# Patient Record
Sex: MALE | Race: ASIAN | NOT HISPANIC OR LATINO | Employment: FULL TIME | URBAN - METROPOLITAN AREA
[De-identification: names, ages, dates, MRNs, and addresses within clinical notes are randomized per-mention and may not be internally consistent; named-entity substitution may affect disease eponyms.]

---

## 2020-06-18 ENCOUNTER — TELEPHONE (OUTPATIENT)
Dept: SCHEDULING | Facility: IMAGING CENTER | Age: 35
End: 2020-06-18

## 2020-08-07 ENCOUNTER — OFFICE VISIT (OUTPATIENT)
Dept: MEDICAL GROUP | Facility: MEDICAL CENTER | Age: 35
End: 2020-08-07
Payer: COMMERCIAL

## 2020-08-07 VITALS
SYSTOLIC BLOOD PRESSURE: 130 MMHG | TEMPERATURE: 97.8 F | RESPIRATION RATE: 16 BRPM | OXYGEN SATURATION: 94 % | WEIGHT: 169.75 LBS | BODY MASS INDEX: 25.73 KG/M2 | HEIGHT: 68 IN | HEART RATE: 67 BPM | DIASTOLIC BLOOD PRESSURE: 86 MMHG

## 2020-08-07 DIAGNOSIS — Z82.49 FAMILY HISTORY OF HEART DISEASE: ICD-10-CM

## 2020-08-07 DIAGNOSIS — Z86.14 HISTORY OF MRSA INFECTION: ICD-10-CM

## 2020-08-07 DIAGNOSIS — G40.409 OTHER GENERALIZED EPILEPSY, NOT INTRACTABLE, WITHOUT STATUS EPILEPTICUS (HCC): ICD-10-CM

## 2020-08-07 DIAGNOSIS — H02.402 PTOSIS OF LEFT EYELID: ICD-10-CM

## 2020-08-07 DIAGNOSIS — Z13.220 LIPID SCREENING: ICD-10-CM

## 2020-08-07 DIAGNOSIS — H02.846 SWELLING OF LEFT EYELID: ICD-10-CM

## 2020-08-07 PROBLEM — G40.909 NONINTRACTABLE EPILEPSY WITHOUT STATUS EPILEPTICUS (HCC): Status: ACTIVE | Noted: 2020-08-07

## 2020-08-07 PROCEDURE — 99204 OFFICE O/P NEW MOD 45 MIN: CPT | Performed by: NURSE PRACTITIONER

## 2020-08-07 RX ORDER — LEVETIRACETAM 500 MG/1
500 TABLET, FILM COATED, EXTENDED RELEASE ORAL DAILY
Qty: 270 TAB | Refills: 1 | Status: SHIPPED | OUTPATIENT
Start: 2020-08-07 | End: 2020-09-08 | Stop reason: SDUPTHER

## 2020-08-07 RX ORDER — LEVETIRACETAM 500 MG/1
500 TABLET, FILM COATED, EXTENDED RELEASE ORAL DAILY
COMMUNITY
End: 2020-08-07 | Stop reason: SDUPTHER

## 2020-08-07 NOTE — LETTER
[facility]  JORGE Mckinney.  [address]  [place] [address]  Fax: [phone]   Authorization for Release/Disclosure of   Protected Health Information   Name: RICARDO STOCK : 1985 SSN: [id number]   Address: [address] [place] [address]  [place] [address] Phone:    [phone] (home)    I authorize the entity listed below to release/disclose the PHI below to:   [facility]/BLANCHE Mckinney and BLANCHE Mckinney   Provider or Entity Name:  [facility]   Address   City, [facility], [place], [place] Phone:  [phone]    Fax:     Reason for request: continuity of care   Information to be released:    [  ] LAST COLONOSCOPY,  including any PATH REPORT and follow-up  [  ] LAST FIT/COLOGUARD RESULT [  ] LAST DEXA  [  ] LAST MAMMOGRAM  [  ] LAST PAP  [  ] LAST LABS [  ] RETINA EXAM REPORT  [  ] IMMUNIZATION RECORDS  [  ] Release all info      [  ] Check here and initial the line next to each item to release ALL health information INCLUDING  _____ Care and treatment for drug and / or alcohol abuse  _____ HIV testing, infection status, or AIDS  _____ Genetic Testing    DATES OF SERVICE OR TIME PERIOD TO BE DISCLOSED: _____________  I understand and acknowledge that:  * This Authorization may be revoked at any time by you in writing, except if your health information has already been used or disclosed.  * Your health information that will be used or disclosed as a result of you signing this authorization could be re-disclosed by the recipient. If this occurs, your re-disclosed health information may no longer be protected by State or Federal laws.  * You may refuse to sign this Authorization. Your refusal will not affect your ability to obtain treatment.  * This Authorization becomes effective upon signing and will  on (date) __________.      If no date is indicated, this Authorization will  one (1) year from the signature date.    Name: Ricardo ESPINAL  Edgar    Signature:   Date:     8/7/2020       PLEASE FAX REQUESTED RECORDS BACK TO: [phone]

## 2020-08-07 NOTE — ASSESSMENT & PLAN NOTE
This is a chronic issue which was initially diagnosed in 2017, he was living in [place] at the time.  He was started on Keppra and has been consistent with this over the last 3 years, has not had any further seizures.  He is needing to establish with a local neurologist

## 2020-08-07 NOTE — LETTER
"      8/7/2020    Subject: Action Required to Complete [employer] Activation    Dear Michi Hernández,    Thank you for enrolling in [employer], a free online tool where you can schedule appointments, request prescription refills, view test results and more. To complete your [employer] activation, please follow the instructions below.     1. Visit [employer]     2. Click \"Enter Code\"    3. Enter activation code: [email or web address]  Expires: 9/6/2020  9:23 AM    4. Follow the instructions on screen to complete the quick, 3-step activation    Once you’ve completed your activation, you’re ready to view your medical record. Please remember, [employer] is not to be used for urgent needs. For medical emergencies, dial 911.    [employer]’s secure online tool allows you to manage your health information day or night. [employer] allows you to:    • Schedule and view appointments  • View test results  • Request prescription refills  • Message your healthcare provider  • Keep track of your family’s health  • Review immunization records  • View or download your Summary of Care document    Download the L4 Mobile Mei   After you’ve created a login by following the steps above, you can download the L4 Mobile mei for your smartphone for even quicker access. Simply visit the mei store and search “[employer]4 Mobile.”    For questions or assistance with [employer], please call Customer Service at [phone].    Sincerely,  Customer Service Team  "

## 2020-08-08 NOTE — PROGRESS NOTES
Chief Complaint   Patient presents with   • Establish Care     new patient   • Referral Needed     neuro, seizure 2017 epilepsy    • Eye Problem     L eye swollen 9-10 months, itching comes and goes     Michilevar Hernández is a 34 y.o. male here to establish care.  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  He was started on Keppra and has been consistent with this over the last 3 years, has not had any further seizures.  He is needing to establish with a local neurologist    Swelling of left eyelid, history of MRSA  This is been an intermittent problem over the last several years, seems to fluctuate but is typically mild.  He does report occasional irritation or slight erythema but has not had any vision change, discharge from the eye, eye injection.  He reports that this was evaluated in [place] by an ophthalmologist, at one point he had a culture from the eye that was positive for MRSA, he was seen by infectious disease at that time and treated with antibiotics.  Symptoms largely resolved but then over the last few months he is again noted the mild swelling of the eyelid.  Denies any fever, chills, headache, nasal congestion or rhinitis    Current medicines (including changes today)  Current Outpatient Medications   Medication Sig Dispense Refill   • levetiracetam (KEPPRA) 500 MG TABLET SR 24 HR Take 1 Tab by mouth every day. Take 3 tabs PO every day 270 Tab 1     No current facility-administered medications for this visit.      He  has no past medical history on file.  He  has no past surgical history on file.  Social History     Tobacco Use   • Smoking status: Never Smoker   • Smokeless tobacco: Current User     Types: Chew   Substance Use Topics   • Alcohol use: Not on file   • Drug use: Not on file     Social History     Social History Narrative   • Not on  "file     Family History   Problem Relation Age of Onset   • Heart Disease Father 60        CABG x 3   • Hyperlipidemia Father    • Hypertension Father      Family Status   Relation Name Status   • Mo  Alive   • Fa  Alive   • Bro  Alive         ROS  Problems listed discussed above, all other systems reviewed and negative     Objective:     /86 (BP Location: Left arm, Patient Position: Sitting, BP Cuff Size: Adult)   Pulse 67   Temp 36.6 °C (97.8 °F) (Temporal)   Resp 16   Ht 1.727 m (5' 8\")   Wt 77 kg (169 lb 12.1 oz)   SpO2 94%  Body mass index is 25.81 kg/m².  Physical Exam:  General: Alert, oriented in no acute distress.  Eye contact is good, speech is normal, affect calm  HEENT: EOMI, perrl, sclera and conjunctiva clear.  Very mild swelling of the left upper eyelid without erythema.   No cervical or supraclavicular lymphadenopathy, thyroid isthmus palpable without masses or nodules.  Lungs: clear to auscultation bilaterally, good aeration, normal effort. No wheeze/ rhonchi/ rales.  CV: regular rate and rhythm, S1, S2. No murmur, no JVD, no edema. Pedal pulses 2 + bilaterally  Abdomen: soft, nontender, BS x4.  Ext: color normal, vascularity normal, temperature normal. No rash or lesions.    Assessment and Plan:   The following treatment plan was discussed   1. Other generalized epilepsy, not intractable, without status epilepticus (HCC)   stable on medication, no recent events.  Refill provided, he will establish with local neurology  levetiracetam (KEPPRA) 500 MG TABLET SR 24 HR    REFERRAL TO NEUROLOGY    Comp Metabolic Panel   2. Ptosis of left eyelid  REFERRAL TO OPHTHALMOLOGY   3. Swelling of left eyelid   slight swelling on exam today without erythema.  He reports having had MRSA infection in his eyes several years ago when living in [place], he had been evaluated by infectious disease at that time.  We will request records related to this.  No evidence of acute infection on exam today but with " continued intermittent swelling I will refer him back to ophthalmology for reevaluation  REFERRAL TO OPHTHALMOLOGY   4. History of MRSA infection  REFERRAL TO OPHTHALMOLOGY   5. Family history of heart disease  Lipid Profile   6. Lipid screening  Lipid Profile       Educated in proper administration of medication(s) ordered today including safety, possible SE, risks, benefits, rationale and alternatives to therapy.       Followup: Pending labs             Please note that this dictation was created using voice recognition software. I have worked with consultants from the vendor as well as technical experts from [employer] to optimize the interface. I have made every reasonable attempt to correct obvious errors, but I expect that there are errors of grammar and possibly content that I did not discover before finalizing the note.

## 2020-08-08 NOTE — ASSESSMENT & PLAN NOTE
This is been an intermittent problem over the last several years, seems to fluctuate but is typically mild.  He does report occasional irritation or slight erythema but has not had any vision change, discharge from the eye, eye injection.  He reports that this was evaluated in [place] by an ophthalmologist, at one point he had a culture from the eye that was positive for MRSA, he was seen by infectious disease at that time and treated with antibiotics.  Symptoms largely resolved but then over the last few months he is again noted the mild swelling of the eyelid

## 2020-08-31 NOTE — PROGRESS NOTES
Chief Complaint   Patient presents with   • New Patient     epilepsy       Problem List Items Addressed This Visit     None      Visit Diagnoses     Localization-related epilepsy (HCC)        Relevant Medications    levetiracetam (KEPPRA) 500 MG TABLET SR 24 HR    Other Relevant Orders    REFERRAL TO NEURODIAGNOSTICS (EEG,EP,EMG/NCS/DBS) Modality Requested: EEG-Video (routine)    MR-BRAIN-WITH & W/O    CBC WITH DIFFERENTIAL    KEPPRA    Vitamin D deficiency        Relevant Orders    VITAMIN D,25 HYDROXY    Screening for depression              History of present illness:  Michi Hernández 35 y.o. male presents today for seizure evaluation.  Referred by PCP.     Pt reports he had a spell in Feb 2017. He reports he lost consciousness and fell on the floor and had a convulsion for about 1 minute. No mouth trauma or incontinence. He had no recollection of events. He was told then that his EEG was mildly abnormal and he was started on keppra. He remembers having LOC a couple months after, he is not sure if he had a convulsion but he remembers waking up and losing track of time. He also recalls missing a dose of keppra then.  He admits to having a lot of stress in 2017 and the night before his initial spell, he had alcoholic drinks. So he called his neurologist at that time who increased his dose to Keppra ER 500mg, 3 tabs daily. No further spells. Denies any staring or jerking spells.     No family hx seizures. Several hx of head concussions from playing football    Drinks alcohol rarely. Not smoking cigarettes. No recreational drug use.     Mood is good. Denies depression or anxiety. No suicidal or homicidal thoughts.      He takes vit D daily.     Driving with no issues.       Past medical history:   Past Medical History:   Diagnosis Date   • Seizure (HCC)        Past surgical history:   History reviewed. No pertinent surgical history.    Family history:   Family History   Problem Relation Age of Onset   • Heart  Disease Father 60        CABG x 3   • Hyperlipidemia Father    • Hypertension Father        Social history:   Social History     Socioeconomic History   • Marital status: Single     Spouse name: Not on file   • Number of children: Not on file   • Years of education: Not on file   • Highest education level: Not on file   Occupational History   • Not on file   Social Needs   • Financial resource strain: Not on file   • Food insecurity     Worry: Not on file     Inability: Not on file   • Transportation needs     Medical: Not on file     Non-medical: Not on file   Tobacco Use   • Smoking status: Never Smoker   • Smokeless tobacco: Current User     Types: Chew   Substance and Sexual Activity   • Alcohol use: Not on file   • Drug use: Not on file   • Sexual activity: Not on file   Lifestyle   • Physical activity     Days per week: Not on file     Minutes per session: Not on file   • Stress: Not on file   Relationships   • Social connections     Talks on phone: Not on file     Gets together: Not on file     Attends [employer] service: Not on file     Active member of club or organization: Not on file     Attends meetings of clubs or organizations: Not on file     Relationship status: Not on file   • Intimate partner violence     Fear of current or ex partner: Not on file     Emotionally abused: Not on file     Physically abused: Not on file     Forced sexual activity: Not on file   Other Topics Concern   • Not on file   Social History Narrative   • Not on file       Current medications:   Current Outpatient Medications   Medication   • levetiracetam (KEPPRA) 500 MG TABLET SR 24 HR     No current facility-administered medications for this visit.        Medication Allergy:  No Known Allergies      Review of systems:     General: Denies fevers or chills, or nightsweats, or generalized fatigue.    Head: Denies headaches or dizziness or lightheadedness  EENT: Denies vision changes, vision loss or pain, nasal secretion, nasal  "bleeding, difficulty swallowing, hearing loss, tinnitus, vertigo, ear pain  Respiratory: Denies shortness of breath, cough, sputum, or wheezing  Cardiac: Denies chest pain, palpitations, edema or syncope  Gastrointestinal: Denies nausea, vomiting, no abdominal pain or change in bowel habits, no melena or hematochezia  Urinary: Denies dysuria, frequency, hesitancy, or incontinence.  Dermatologic:  Denies new rash  Musculoskeletal: Denies muscle pain or swelling, no atrophy, no neck and back pain or stiffness.   Neurologic: Denies facial droopiness, muscle weakness (focal or generalized), paresthesias, ataxia, change in speech or language, memory loss, abnormal movements, seizures, loss of consciousness, or episodes of confusion.   Psychiatric: Denies anxiety, depression, mood swings, suicidal or homicidal thoughts       Physical examination:   Vitals:    09/08/20 1039   BP: 122/68   BP Location: Right arm   Patient Position: Sitting   BP Cuff Size: Adult   Pulse: (!) 58   Resp: 18   Temp: (!) 35.5 °C (95.9 °F)   TempSrc: Temporal   SpO2: 96%   Weight: 77 kg (169 lb 12.1 oz)   Height: 1.727 m (5' 8\")     General: Patient in no acute distress, pleasant and cooperative.  HEENT: Normocephalic, no signs of acute trauma.   moist conjunctivae. Nares are patent. Oropharynx clear without lesions and normal  hard and soft palates.   Neck: Supple. There is normal range of motion.   Resp: clear to auscultation bilaterally. No wheezes or crackles.   CV: RRR, no murmurs.   Skin: no signs of acute rashes or trauma.   Musculoskeletal: joints exhibit full range of motion, without any pain to palpation. There are no signs of joint or muscle swelling. There is no tenderness to deep palpation of muscles.   Psychiatric: No hallucinatory behavior. No symptoms of depression or suicidal ideation. Mood and affect appear normal on exam.     NEUROLOGICAL EXAM:   Mental status, orientation: Awake, alert and fully oriented.   Speech and " language: speech is clear and fluent. The patient is able to name, repeat and comprehend.   Memory: There is intact recollection of recent and remote events.   Cranial nerve exam:   CN I: Not examined   CN II: PERRL.  CN III, IV, VI: EOMI; no nystagmus   CN V: Facial sensation intact bilaterally   CN VII: face symmetric   CN VIII: hearing intact to finger rub bilaterally   CN IX, X: palate elevates symmetrically   CN XI: Symmetric shoulder shrug  CN XII: tongue midline. No signs of tongue biting or fasciculations   Motor exam: Strength is 5/5 in all extremities. Tone is normal. No abnormal movements were seen on exam.   Sensory exam reveals normal sense of light touch in all extremities.   Deep tendon reflexes:  2+ throughout. Plantar responses are flexor. There is no clonus.   Coordination: shows a normal finger-nose-finger. Normal rapidly alternating movements.   Gait: The patient was able to get up from seated position on first attempt without requiring assistance. Found to be steady when walking. Movements were fluid with normal arm swing. The patient was able to turn without difficulties or tendency to fall. Romberg exam unremarkable.      ANCILLARY DATA REVIEWED:       Lab Data Review:  Reviewed in chart.     Records reviewed:   Reviewed in chart.    Imaging:   n/a    EEG:  n/a        ASSESSMENT AND PLAN:    1. Localization-related epilepsy (HCC)  - REFERRAL TO NEURODIAGNOSTICS (EEG,EP,EMG/NCS/DBS) Modality Requested: EEG-Video (routine)  - MR-BRAIN-WITH & W/O; Future  - CBC WITH DIFFERENTIAL; Future  - KEPPRA; Future    2. Vitamin D deficiency  - VITAMIN D,25 HYDROXY; Future    3. Screening for depression          CLINICAL DISCUSSION:  Total of 2 seizure like events in 2017. The first one may have been provoked by stress and alcohol use and the second one was may be d/t missing keppra dose. He has not had further spells on keppra ER 500mg, 3 tabs QHS. No side effects. EEG then was reported mildly abnormal. No  file to review.     No family hx of epilepsy. Hx of concussions with no LOc from playing football    Rarely drinks alcohol.  Not smoking cigarettes.  No recreational drug use.    Mood is good. No suicidal or homicidal thoughts.    Past ASM's: none    Current ASM's: Keppra  mg, 3 tabs 3 times daily QHS      Plan:  - Routine EEG and MRI brain. Pt is interested in being off medication of his medication or in lowering the dose EEG is normal.     - Discussed avoidance of spell/sz triggers: alcohol, sleep deprivation, and stress.    - Discussed Vit D supplementation. Recommended taking 2000-5000u daily.    - Discussed driving restrictions. Okay to drive but aware to stop driving immediately if a spell occurs and report to us.      -Labs to be checked for next appointment: cbc, cmp, keppra, vit D              FOLLOW-UP:   Return in about 4 weeks (around 10/6/2020), or or after work up..      EDUCATION AND COUNSELING:  -Education was provided to the patient and/or family regarding diagnosis and prognosis. The chronic and unpredictable nature of the condition were discussed. There is increased risk for additional events, which may carry potential for significant injuries and death. Discussed frequent seizure triggers: sleep deprivation, medication non-compliance, use of illegal drugs/alcohol, stress, and others.   -We reviewed in detail the current antiepileptic regimen. Potential side effects of antiepileptics were discussed at length, including but no limited to: hypersensitivity reactions (rash and others, some of which can be fatal), visual field changes (some of which may be irreversible), glaucoma, diplopia, kidney stones, osteopenia/osteoporosis/bone fractures, hyperthermia/anhydrosis, hyponatremia, tremors/abnormal movements, ataxia, dizziness, fatigue, increased risk for falls, risk for cardiac arrhythmias/syncope, gastrointestinal side effects(hepatitis, pancreatitis, gastritis, ulcers), gingival  hypertrophy/bleeding, drowsiness, sedation, anxiety/nervousness, increased risk for suicide, increased risk for depression, and psychosis.   -We also reviewed drug-drug interactions and their potential effect on seizure control and medication side effects.    -Recommend chronic vitamin D supplementation and regular exercise (if not contraindicated).   -Patient/family educated on risk for SUDEP (Sudden Death in Epilepsy). Counseling was provided on the importance of strict medication and follow up compliance. The patient/family understand the risks associated with non-adherence with the medical plan as outlined, including but not limited to an increased risk for breakthrough seizures, which may contribute to injuries, disability, status epilepticus, and even death.   -Counseling was also provided on potential effects of alcohol and other drugs, which may lower seizure threshold and/or affect the metabolism of antiepileptic drugs. We recommend avoidance of alcohol and illegal drugs.  -Avoid sleep deprivation.   -We extensively discussed the aspects related to safety in drivers who suffer from epilepsy. The patient is encourage to report to the Division of Motor Vehicles of any condition and/or spells related to confusion, disorientation, and/or loss of awareness and/or loss of consciousness; as these may pose a safety issue if they occur while operating a motor vehicle. The patient and/or family are ultimately responsible for exercising caution and abiding to regulations in place.   -Other seizure precautions were discussed at length, including no diving, no skydiving, no climbing or exposure to unprotected heights, no unsupervised swimming, no Jacuzzi or bathing in bathtubs or deep bodies of water. The patient/family have been advised about risks for operating any machinery while suffering from seizures / syncope / epilepsy and/or while taking antiepileptic drugs.   -The patient understands and agrees that due to the  complexity of his/her diagnosis, results of any testing and further recommendations will typically be discussed/made during a face to face encounter in my office. The patient and/or family further understands it is their responsibility to keep proper follow up.     Patient/family agree with plan, as outlined.         Terri Parikh, MSN, APRN, FNP-C  [facility]  Office: [phone]  Fax: [phone]

## 2020-09-08 ENCOUNTER — OFFICE VISIT (OUTPATIENT)
Dept: NEUROLOGY | Facility: MEDICAL CENTER | Age: 35
End: 2020-09-08
Payer: COMMERCIAL

## 2020-09-08 VITALS
TEMPERATURE: 95.9 F | HEIGHT: 68 IN | DIASTOLIC BLOOD PRESSURE: 68 MMHG | SYSTOLIC BLOOD PRESSURE: 122 MMHG | OXYGEN SATURATION: 96 % | WEIGHT: 169.75 LBS | HEART RATE: 58 BPM | BODY MASS INDEX: 25.73 KG/M2 | RESPIRATION RATE: 18 BRPM

## 2020-09-08 DIAGNOSIS — G40.109 LOCALIZATION-RELATED EPILEPSY (HCC): ICD-10-CM

## 2020-09-08 DIAGNOSIS — E55.9 VITAMIN D DEFICIENCY: ICD-10-CM

## 2020-09-08 DIAGNOSIS — Z13.31 SCREENING FOR DEPRESSION: ICD-10-CM

## 2020-09-08 PROCEDURE — 99204 OFFICE O/P NEW MOD 45 MIN: CPT | Performed by: NURSE PRACTITIONER

## 2020-09-08 RX ORDER — LEVETIRACETAM 500 MG/1
500 TABLET, FILM COATED, EXTENDED RELEASE ORAL DAILY
Qty: 270 TAB | Refills: 3 | Status: SHIPPED | OUTPATIENT
Start: 2020-09-08 | End: 2022-01-25 | Stop reason: SDUPTHER

## 2020-09-08 ASSESSMENT — PATIENT HEALTH QUESTIONNAIRE - PHQ9: CLINICAL INTERPRETATION OF PHQ2 SCORE: 0

## 2020-10-05 ENCOUNTER — APPOINTMENT (OUTPATIENT)
Dept: RADIOLOGY | Facility: MEDICAL CENTER | Age: 35
End: 2020-10-05
Attending: NURSE PRACTITIONER
Payer: COMMERCIAL

## 2020-10-05 DIAGNOSIS — G40.109 LOCALIZATION-RELATED EPILEPSY (HCC): ICD-10-CM

## 2020-10-05 PROCEDURE — A9576 INJ PROHANCE MULTIPACK: HCPCS | Performed by: STUDENT IN AN ORGANIZED HEALTH CARE EDUCATION/TRAINING PROGRAM

## 2020-10-05 PROCEDURE — 70553 MRI BRAIN STEM W/O & W/DYE: CPT

## 2020-10-05 PROCEDURE — 700117 HCHG RX CONTRAST REV CODE 255: Performed by: STUDENT IN AN ORGANIZED HEALTH CARE EDUCATION/TRAINING PROGRAM

## 2020-10-05 RX ADMIN — GADOTERIDOL 15 ML: 279.3 INJECTION, SOLUTION INTRAVENOUS at 08:19

## 2020-10-28 ENCOUNTER — NON-PROVIDER VISIT (OUTPATIENT)
Dept: NEUROLOGY | Facility: MEDICAL CENTER | Age: 35
End: 2020-10-28
Payer: COMMERCIAL

## 2020-10-28 DIAGNOSIS — G40.109 LOCALIZATION-RELATED EPILEPSY (HCC): ICD-10-CM

## 2020-10-28 NOTE — PROCEDURES
VIDEO ELECTROENCEPHALOGRAM REPORT      Referring provider: Terri Rodriguez    DOS: 10/28/20 (0 hours and 25 minutes of total recording time).     INDICATION:  Michi Hernández 35 y.o. male presenting with localization related epilepsy with recent episodes of concerning for seizures.    CURRENT ANTIEPILEPTIC AND/OR SEDATING REGIMEN: Keppra    TECHNIQUE: 30 channel video electroencephalogram (EEG) was performed in accordance with the international 10-20 system. The study was reviewed in bipolar and referential montages. The recording examined the patient in the awake and drowsy/sleep state(s).     DESCRIPTION OF THE RECORD:  During wakefulness, the background was continuous and showed a 9-10 Hz posterior dominant rhythm.  There was reactivity to eye closure/opening.  A normal anterior-posterior gradient was noted with faster beta frequencies seen anteriorly.  During drowsiness, theta/delta frequencies were seen.    Sleep was captured and was characterized by diffuse background delta/theta activity with a loss of myogenic artifact.  N2 sleep transients in the form of sleep spindles and vertex waves were seen in the leads over the central regions.     ACTIVATION PROCEDURES:   Hyperventilation was performed by the patient for a total of 3 minutes. The technician performing the test noted good effort. This technique produced electroencephalographic changes in keeping with the expected bilaterally synchronous, frontally predominant, high amplitude slow waves build up.      Intermittent Photic stimulation was performed in a stepwise fashion from 1 to 30 Hz, and did not elicit any abnormal responses.      ICTAL AND INTERICTAL FINDINGS:   No focal or generalized epileptiform activity noted.     No regional slowing was seen during this routine study.      No clinical events or seizures were reported or recorded during the study.     EKG: sampling of the EKG recording did not demonstrate any abnormalities    EVENTS:   None    INTERPRETATION:  Normal video EEG recording in the awake and drowsy/sleep state(s):  - No persistent focal asymmetries seen.  - No epileptiform discharges seen   - No seizures. Clinical correlation is recommended.      Note: A normal EEG does not rule out epilepsy.  If the clinical suspicion remains high for seizures, a prolonged recording to capture clinical or subclinical events may be helpful.        Shamar Interiano MD  Epilepsy and General Neurology  Department of Neurology  Clinical  of Neurology [facility].   Office: [phone]  Fax: [phone]

## 2022-01-25 ENCOUNTER — TELEPHONE (OUTPATIENT)
Dept: MEDICAL GROUP | Facility: MEDICAL CENTER | Age: 37
End: 2022-01-25

## 2022-01-25 RX ORDER — LEVETIRACETAM 500 MG/1
500 TABLET, FILM COATED, EXTENDED RELEASE ORAL DAILY
Qty: 270 TABLET | Refills: 0 | Status: SHIPPED | OUTPATIENT
Start: 2022-01-25

## 2022-01-25 NOTE — TELEPHONE ENCOUNTER
Phone Number Called: [phone]     Call outcome: Spoke to patient regarding message below.    Message: Patient needs appointment to establish with new PCP for future medication refills. Patient states he has moved out of [place] and is currently looking for new PCP, only needed refill in the meantime.